# Patient Record
Sex: MALE | Race: WHITE | NOT HISPANIC OR LATINO | URBAN - METROPOLITAN AREA
[De-identification: names, ages, dates, MRNs, and addresses within clinical notes are randomized per-mention and may not be internally consistent; named-entity substitution may affect disease eponyms.]

---

## 2020-07-25 ENCOUNTER — TELEPHONE ENCOUNTER (OUTPATIENT)
Dept: URBAN - METROPOLITAN AREA CLINIC 13 | Facility: CLINIC | Age: 50
End: 2020-07-25

## 2020-07-26 ENCOUNTER — TELEPHONE ENCOUNTER (OUTPATIENT)
Dept: URBAN - METROPOLITAN AREA CLINIC 13 | Facility: CLINIC | Age: 50
End: 2020-07-26

## 2020-07-26 RX ORDER — SPIRONOLACTONE 100 MG/1
TAKE 1 TABLET ONCE DAILY TABLET, FILM COATED ORAL
Qty: 30 | Refills: 3 | Status: ACTIVE | COMMUNITY
Start: 2017-01-26

## 2020-07-26 RX ORDER — FUROSEMIDE 40 MG/1
TAKE 1 TABLET DAILY TABLET ORAL
Qty: 30 | Refills: 3 | Status: ACTIVE | COMMUNITY
Start: 2017-01-26

## 2021-01-06 ENCOUNTER — CLAIMS CREATED FROM THE CLAIM WINDOW (OUTPATIENT)
Dept: URBAN - METROPOLITAN AREA MEDICAL CENTER 19 | Facility: MEDICAL CENTER | Age: 51
End: 2021-01-06
Payer: SELF-PAY

## 2021-01-06 DIAGNOSIS — K76.6 PORTAL HYPERTENSION: ICD-10-CM

## 2021-01-06 DIAGNOSIS — R50.9 FEVER, UNSPECIFIED: ICD-10-CM

## 2021-01-06 DIAGNOSIS — K70.31 ALCOHOLIC CIRRHOSIS OF LIVER WITH ASCITES: ICD-10-CM

## 2021-01-06 DIAGNOSIS — R79.89 OTHER SPECIFIED ABNORMAL FINDINGS OF BLOOD CHEMISTRY: ICD-10-CM

## 2021-01-06 PROCEDURE — 99253 IP/OBS CNSLTJ NEW/EST LOW 45: CPT | Performed by: INTERNAL MEDICINE

## 2021-02-17 ENCOUNTER — WEB ENCOUNTER (OUTPATIENT)
Dept: URBAN - METROPOLITAN AREA CLINIC 113 | Facility: CLINIC | Age: 51
End: 2021-02-17

## 2021-02-17 ENCOUNTER — OFFICE VISIT (OUTPATIENT)
Dept: URBAN - METROPOLITAN AREA CLINIC 113 | Facility: CLINIC | Age: 51
End: 2021-02-17
Payer: SELF-PAY

## 2021-02-17 VITALS
BODY MASS INDEX: 35.99 KG/M2 | HEIGHT: 69 IN | DIASTOLIC BLOOD PRESSURE: 86 MMHG | TEMPERATURE: 98.4 F | HEART RATE: 99 BPM | SYSTOLIC BLOOD PRESSURE: 147 MMHG | WEIGHT: 243 LBS

## 2021-02-17 DIAGNOSIS — K70.31 ALCOHOLIC CIRRHOSIS OF LIVER WITH ASCITES: ICD-10-CM

## 2021-02-17 DIAGNOSIS — K76.6 PORTAL HYPERTENSION: ICD-10-CM

## 2021-02-17 PROCEDURE — 99214 OFFICE O/P EST MOD 30 MIN: CPT | Performed by: NURSE PRACTITIONER

## 2021-02-17 PROCEDURE — G8427 DOCREV CUR MEDS BY ELIG CLIN: HCPCS | Performed by: NURSE PRACTITIONER

## 2021-02-17 RX ORDER — FUROSEMIDE 40 MG/1
TAKE 1 TABLET DAILY TABLET ORAL
Qty: 30 | Refills: 3 | Status: ACTIVE | COMMUNITY
Start: 2017-01-26

## 2021-02-17 RX ORDER — SPIRONOLACTONE 100 MG/1
TAKE 1 TABLET ONCE DAILY TABLET, FILM COATED ORAL
Qty: 30 | Refills: 3 | Status: ON HOLD | COMMUNITY
Start: 2017-01-26

## 2021-02-17 RX ORDER — SPIRONOLACTONE 100 MG/1
1 TABLET AND 1/2 TABLET, FILM COATED ORAL ONCE A DAY
Status: ACTIVE | COMMUNITY

## 2021-02-17 RX ORDER — METFORMIN HYDROCHLORIDE 500 MG/1
1 TABLET WITH A MEAL TABLET, FILM COATED ORAL TWICE DAILY
Status: ACTIVE | COMMUNITY

## 2021-02-17 NOTE — HPI-TODAY'S VISIT:
50-year-old male with a history of alcohol-induced cirrhosis, presenting for follow-up after hospitalization for sepsis and ascites.  He was seen in consultation by Dr. Marcum.  Sepsis was of unknown etiology, thought to be either urine or cellulitis.  There was no evidence for SBP.  We will consult it to establish care for cirrhosis as he had previously been managed by his primary care physician regarding cirrhosis.  Diuretics were to be held during admission for sepsis given increased risk for development of hepatorenal syndrome.  He was to be discharged on Lasix 40 mg and Aldactone 100 mg daily once sepsis had resolved.  He was recommended a fluid restriction.  His meld score was noted to be 22, but this was likely falsely elevated secondary to sepsis.  Labs would need to be repeated as an outpatient.  TIPS procedure with consideration if he failed fluid restriction and diuretics for ascites.  Again, TIPS was contraindicated in the setting of sepsis.  He was also noted to be due for colonoscopy for colon cancer screening.  He reports a history of cirrhosis, diagnosed 4 years ago. He admits to an occasional glass of wine with dinner, but none since hospitalization. There is no jaundice or icterus. He denies any confusion. He is a light sleeper but is always able to fall back to sleep. There is lower extremity edema as well as discoloration in the lower extremities. He denies any difficulty taking deep breaths. He admits that his abdomen is uncomfortable due to size. He has bowel movements daily, sometimes twice daily. There is no blood per rectum, melena, or pale stools. No dark urine.  He is following a fluid restriction: 8 ounces with meals. He is taking furosemide 40 mg once daily, and spironolactone 150 mg daily. He is following a low salt diet. He is trying to eat a healthy diet. He last had blood work about one month ago. No fever or chills since hospital discharge. No nausea, vomiting, hematemesis. No heartburn or dysphagia.

## 2021-03-24 ENCOUNTER — OFFICE VISIT (OUTPATIENT)
Dept: URBAN - METROPOLITAN AREA CLINIC 113 | Facility: CLINIC | Age: 51
End: 2021-03-24
Payer: SELF-PAY

## 2021-03-24 VITALS
DIASTOLIC BLOOD PRESSURE: 79 MMHG | RESPIRATION RATE: 18 BRPM | SYSTOLIC BLOOD PRESSURE: 120 MMHG | BODY MASS INDEX: 32.88 KG/M2 | HEART RATE: 95 BPM | HEIGHT: 69 IN | WEIGHT: 222 LBS | TEMPERATURE: 98.2 F

## 2021-03-24 DIAGNOSIS — K70.31 ALCOHOLIC CIRRHOSIS OF LIVER WITH ASCITES: ICD-10-CM

## 2021-03-24 DIAGNOSIS — R93.2 ABNORMAL CT OF LIVER: ICD-10-CM

## 2021-03-24 DIAGNOSIS — K76.6 PORTAL HYPERTENSION: ICD-10-CM

## 2021-03-24 PROCEDURE — 99213 OFFICE O/P EST LOW 20 MIN: CPT | Performed by: NURSE PRACTITIONER

## 2021-03-24 RX ORDER — FUROSEMIDE 40 MG/1
TAKE 1 TABLET DAILY TABLET ORAL
Qty: 30 | Refills: 3 | Status: ACTIVE | COMMUNITY
Start: 2017-01-26

## 2021-03-24 RX ORDER — SPIRONOLACTONE 100 MG/1
1 TABLET AND 1/2 TABLET, FILM COATED ORAL ONCE A DAY
OUTPATIENT

## 2021-03-24 RX ORDER — FUROSEMIDE 40 MG/1
TAKE 1 TABLET DAILY TABLET ORAL
OUTPATIENT
Start: 2017-01-26

## 2021-03-24 RX ORDER — METFORMIN HYDROCHLORIDE 500 MG/1
1 TABLET WITH A MEAL TABLET, FILM COATED ORAL TWICE DAILY
Status: ACTIVE | COMMUNITY

## 2021-03-24 RX ORDER — SPIRONOLACTONE 100 MG/1
1 TABLET AND 1/2 TABLET, FILM COATED ORAL ONCE A DAY
Status: ACTIVE | COMMUNITY

## 2021-03-24 RX ORDER — SPIRONOLACTONE 100 MG/1
TAKE 1 TABLET ONCE DAILY TABLET, FILM COATED ORAL
Qty: 30 | Refills: 3 | Status: ON HOLD | COMMUNITY
Start: 2017-01-26

## 2021-03-24 NOTE — HPI-TODAY'S VISIT:
This is a 50-year-old male with a history of alcohol induced cirrhosis, presenting for 4-week follow-up.  He was last seen in the office on 2/17/2021 in follow-up after hospitalization for sepsis and ascites. He was seen in consultation by Dr. Marcum. Sepsis was of unknown etiology, thought to be either urine or cellulitis. There was not any evidence of SBP. At last visit, he reported cirrhosis diagnosed 4 years ago, previously managed by his PCP. He admitted to an occasional glass of wine with dinner but none since his hospitalization. There was no jaundice or icterus. He denied any confusion or changes in his sleep-wake cycle. He did admit some lower extremity edema as well as discoloration in his lower extremities. He was taking furosemide 40 mg daily and spironolactone 150 mg daily. He was following a low-salt diet. His meld score while inpatient was noted to be 22, likely increased from sepsis. He was planned for repeat labs to calculate a meld score as well as to include AFP for HCC screening. Abdominal ultrasound while inpatient revealed cirrhotic liver morphology without evidence of mass or lesion. He was planned for a large-volume paracentesis with plans to send body fluid for cell count, culture and albumin. A referral for TIPS placement was to be considered. He was counseled on complete alcohol cessation.  Labs 2/23/2021: Sodium 134, potassium 4.8, BUN 13, creatinine 0.6, T bili 2.4, AST 36, , ALT 16, AFP 3.4, WBC 5.5, hemoglobin 15, hematocrit 44.8, .7, platelets 160, INR 1.31, meld score 16, serum albumin 3.5   Ultrasound-guided paracentesis 2/23/2020: 13,450 cc of ascitic fluid was removed from the peritoneal cavity with 60 cc sent for cell count and differential. Ascitic fluid revealed an albumin of 1.2,   CT of the abdomen and pelvis with contrast 2/26/2021: Cirrhotic liver morphology with heterogeneity involving the right hepatic lobe along the dome possibly representing heterogenous perfusion although lesion cannot be excluded. A nonemergent follow-up MRI liver mass protocol was recommended. Findings of portal hypertension including small volume ascites and varices including esophageal varices. There was mild adenopathy, likely reactive. Significant infiltration of the right lateral abdominal wall and subcutaneous soft tissues extending inferiorly into the pelvis and right groin concerning for cellulitis.  He reports feeling well today. He is compliant with furosemide 40 mg daily and spironolactone 100 mg daily. He has not had any recurrent swelling in his ankles or legs since paracentesis. There is no abdominal pain, nausea or vomiting. No jaundice or icterus. Bowels are moving normally. He continues to sleep well at nighttime, and denies any confusion. He is following a low salt diet, and is avoiding excessive fluid intake. He remains abstinent from ETOH.

## 2021-04-02 ENCOUNTER — TELEPHONE ENCOUNTER (OUTPATIENT)
Dept: URBAN - METROPOLITAN AREA CLINIC 113 | Facility: CLINIC | Age: 51
End: 2021-04-02

## 2021-10-06 ENCOUNTER — OFFICE VISIT (OUTPATIENT)
Dept: URBAN - METROPOLITAN AREA CLINIC 113 | Facility: CLINIC | Age: 51
End: 2021-10-06
Payer: SELF-PAY

## 2021-10-06 ENCOUNTER — DASHBOARD ENCOUNTERS (OUTPATIENT)
Age: 51
End: 2021-10-06

## 2021-10-06 VITALS
HEIGHT: 69 IN | SYSTOLIC BLOOD PRESSURE: 115 MMHG | WEIGHT: 248 LBS | BODY MASS INDEX: 36.73 KG/M2 | TEMPERATURE: 98.1 F | DIASTOLIC BLOOD PRESSURE: 73 MMHG | HEART RATE: 93 BPM | RESPIRATION RATE: 18 BRPM

## 2021-10-06 DIAGNOSIS — K76.6 PORTAL HYPERTENSION: ICD-10-CM

## 2021-10-06 DIAGNOSIS — K42.0 UMBILICAL HERNIA WITH OBSTRUCTION, WITHOUT GANGRENE: ICD-10-CM

## 2021-10-06 DIAGNOSIS — K70.31 ALCOHOLIC CIRRHOSIS OF LIVER WITH ASCITES: ICD-10-CM

## 2021-10-06 DIAGNOSIS — R93.2 ABNORMAL CT OF LIVER: ICD-10-CM

## 2021-10-06 PROBLEM — 34742003 PORTAL HYPERTENSION: Status: ACTIVE | Noted: 2021-02-17

## 2021-10-06 PROBLEM — 420054005 ALCOHOLIC CIRRHOSIS: Status: ACTIVE | Noted: 2021-02-17

## 2021-10-06 PROBLEM — 15634181000119107: Status: ACTIVE | Noted: 2021-03-24

## 2021-10-06 PROCEDURE — 99214 OFFICE O/P EST MOD 30 MIN: CPT | Performed by: INTERNAL MEDICINE

## 2021-10-06 RX ORDER — SPIRONOLACTONE 100 MG/1
1 TABLET AND 1/2 TABLET, FILM COATED ORAL ONCE A DAY
Status: ACTIVE | COMMUNITY

## 2021-10-06 RX ORDER — FUROSEMIDE 40 MG/1
TAKE 1 TABLET DAILY TABLET ORAL
Status: ACTIVE | COMMUNITY
Start: 2017-01-26

## 2021-10-06 RX ORDER — SPIRONOLACTONE 100 MG/1
TAKE 1 TABLET ONCE DAILY TABLET, FILM COATED ORAL
Qty: 30 | Refills: 3 | Status: ON HOLD | COMMUNITY
Start: 2017-01-26

## 2021-10-06 RX ORDER — METFORMIN HYDROCHLORIDE 500 MG/1
1 TABLET WITH A MEAL TABLET, FILM COATED ORAL TWICE DAILY
Status: ACTIVE | COMMUNITY

## 2021-10-06 RX ORDER — FUROSEMIDE 40 MG/1
1 TABLET TABLET ORAL TWICE A DAY
Qty: 180 TABLET | Refills: 1 | OUTPATIENT
Start: 2021-10-06

## 2021-10-06 NOTE — EXAM-PHYSICAL EXAM
He is alert and oriented to person place and situation no acute distress.  Has a very large umbilical hernia that is soft and reproducible with some ulcerations present.  He also has 2-3+ lower extremity edema bilaterally.

## 2021-10-06 NOTE — HPI-TODAY'S VISIT:
This is a 50-year-old male with a history of alcohol induced cirrhosis, presenting for 4-week follow-up.  He was last seen in the office on 2/17/2021 in follow-up after hospitalization for sepsis and ascites. He was seen in consultation by Dr. Marcum. Sepsis was of unknown etiology, thought to be either urine or cellulitis. There was not any evidence of SBP. At last visit, he reported cirrhosis diagnosed 4 years ago, previously managed by his PCP. He admitted to an occasional glass of wine with dinner but none since his hospitalization. There was no jaundice or icterus. He denied any confusion or changes in his sleep-wake cycle. He did admit some lower extremity edema as well as discoloration in his lower extremities. He was taking furosemide 40 mg daily and spironolactone 150 mg daily. He was following a low-salt diet. His meld score while inpatient was noted to be 22, likely increased from sepsis. He was planned for repeat labs to calculate a meld score as well as to include AFP for HCC screening. Abdominal ultrasound while inpatient revealed cirrhotic liver morphology without evidence of mass or lesion. He was planned for a large-volume paracentesis with plans to send body fluid for cell count, culture and albumin. A referral for TIPS placement was to be considered. He was counseled on complete alcohol cessation.  Labs 2/23/2021: Sodium 134, potassium 4.8, BUN 13, creatinine 0.6, T bili 2.4, AST 36, , ALT 16, AFP 3.4, WBC 5.5, hemoglobin 15, hematocrit 44.8, .7, platelets 160, INR 1.31, meld score 16, serum albumin 3.5   Ultrasound-guided paracentesis 2/23/2020: 13,450 cc of ascitic fluid was removed from the peritoneal cavity with 60 cc sent for cell count and differential. Ascitic fluid revealed an albumin of 1.2,   CT of the abdomen and pelvis with contrast 2/26/2021: Cirrhotic liver morphology with heterogeneity involving the right hepatic lobe along the dome possibly representing heterogenous perfusion although lesion cannot be excluded. A nonemergent follow-up MRI liver mass protocol was recommended. Findings of portal hypertension including small volume ascites and varices including esophageal varices. There was mild adenopathy, likely reactive. Significant infiltration of the right lateral abdominal wall and subcutaneous soft tissues extending inferiorly into the pelvis and right groin concerning for cellulitis.  He reports feeling well today. He is compliant with furosemide 40 mg daily and spironolactone 100 mg daily. He has not had any recurrent swelling in his ankles or legs since paracentesis. There is no abdominal pain, nausea or vomiting. No jaundice or icterus. Bowels are moving normally. He continues to sleep well at nighttime, and denies any confusion. He is following a low salt diet, and is avoiding excessive fluid intake. He remains abstinent from ETOH. The patient had MRI of the liver performed in May of this year.  This revealed the cirrhosis is known.  He did have a small ill-defined lesion in the liver that was not thought to be hepatocellular carcinoma.  They were enlarged tabatha hepatis lymph nodes thought to be reactive.  We have laboratory testing from August of this year which showed a white cell count of 4.9 hemoglobin 14.9 MCV of 106 with platelet count of 109.  AST was 77 alkaline phosphatase 128 ALT 30.  Total bilirubin was 2.  Albumin 3.9.  INR was 1.3. The patient returns today for follow-up.  Interval history.  The patient states since I last saw him over the past 2 months he has developed an umbilical hernia and has been going back and forth to the emergency room because of this.  It does not hurt but has become quite large while he is at work it often gets in the way.  He states his last drink of alcohol was a few weeks back.  This was wine.  We had a long discussion about abstinence.  He states he is fluid restricting and is compliant with his diuretics.